# Patient Record
Sex: MALE | Race: WHITE | NOT HISPANIC OR LATINO | Employment: PART TIME | URBAN - METROPOLITAN AREA
[De-identification: names, ages, dates, MRNs, and addresses within clinical notes are randomized per-mention and may not be internally consistent; named-entity substitution may affect disease eponyms.]

---
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
## 2024-01-11 ENCOUNTER — OFFICE VISIT (OUTPATIENT)
Dept: PRIMARY CARE | Facility: CLINIC | Age: 19
End: 2024-01-11
Payer: COMMERCIAL

## 2024-01-11 VITALS
HEIGHT: 75 IN | SYSTOLIC BLOOD PRESSURE: 120 MMHG | TEMPERATURE: 98 F | HEART RATE: 85 BPM | BODY MASS INDEX: 39.17 KG/M2 | WEIGHT: 315 LBS | DIASTOLIC BLOOD PRESSURE: 69 MMHG

## 2024-01-11 DIAGNOSIS — E66.01 CLASS 3 SEVERE OBESITY DUE TO EXCESS CALORIES WITH SERIOUS COMORBIDITY AND BODY MASS INDEX (BMI) OF 50.0 TO 59.9 IN ADULT (MULTI): ICD-10-CM

## 2024-01-11 DIAGNOSIS — Z71.3 ENCOUNTER FOR WEIGHT LOSS COUNSELING: Primary | ICD-10-CM

## 2024-01-11 DIAGNOSIS — I10 PRIMARY HYPERTENSION: ICD-10-CM

## 2024-01-11 PROCEDURE — 3074F SYST BP LT 130 MM HG: CPT | Performed by: INTERNAL MEDICINE

## 2024-01-11 PROCEDURE — 1036F TOBACCO NON-USER: CPT | Performed by: INTERNAL MEDICINE

## 2024-01-11 PROCEDURE — 3008F BODY MASS INDEX DOCD: CPT | Performed by: INTERNAL MEDICINE

## 2024-01-11 PROCEDURE — 99213 OFFICE O/P EST LOW 20 MIN: CPT | Performed by: INTERNAL MEDICINE

## 2024-01-11 PROCEDURE — 3078F DIAST BP <80 MM HG: CPT | Performed by: INTERNAL MEDICINE

## 2024-01-11 RX ORDER — PHENTERMINE HYDROCHLORIDE 37.5 MG/1
37.5 TABLET ORAL
Qty: 30 TABLET | Refills: 0 | Status: SHIPPED | OUTPATIENT
Start: 2024-01-11 | End: 2024-02-13 | Stop reason: SINTOL

## 2024-01-11 ASSESSMENT — ENCOUNTER SYMPTOMS
NERVOUS/ANXIOUS: 0
LIGHT-HEADEDNESS: 0
DIZZINESS: 0
SHORTNESS OF BREATH: 0
ABDOMINAL PAIN: 0
NAUSEA: 0
CHILLS: 0
COUGH: 0
SORE THROAT: 0
VOMITING: 0
FEVER: 0

## 2024-01-11 NOTE — PROGRESS NOTES
"Subjective   Patient ID: Olivier Kilgore is a 18 y.o. male who presents for Follow-up (1 month ).    HPI patient presents to the office today to follow-up on weight loss.  This is a 1 month follow-up for the patient.  He was last seen on December 4, 2023 at that time patient's weight was 446 pounds today his weight is 444 pounds.  His blood pressure is now well-controlled.  Blood pressure 120/69 today in the office last blood pressure is 160/89 in December.  We did increase his lisinopril during last office visit.  Patient tolerating medications well.  No adverse side effects.  Patient tolerating topiramate well for weight loss.  Patient is interested in starting phentermine for weight loss as well.  Will follow-up in 1 month for recheck.  Is already dieting and exercising but is not losing a pound a week which is our goal.    Review of Systems   Constitutional:  Negative for chills and fever.   HENT:  Negative for sore throat.    Eyes:  Negative for visual disturbance.   Respiratory:  Negative for cough and shortness of breath.    Cardiovascular:  Negative for chest pain.   Gastrointestinal:  Negative for abdominal pain, nausea and vomiting.   Skin:  Negative for rash.   Neurological:  Negative for dizziness and light-headedness.   Psychiatric/Behavioral:  Negative for behavioral problems. The patient is not nervous/anxious.        Objective   /69   Pulse 85   Temp 36.7 °C (98 °F) (Temporal)   Ht 1.905 m (6' 3\")   Wt (!) 202 kg (444 lb 6.4 oz)   BMI 55.55 kg/m²     Physical Exam  Vitals and nursing note reviewed.   Constitutional:       General: He is not in acute distress.     Appearance: Normal appearance. He is obese. He is not ill-appearing, toxic-appearing or diaphoretic.   HENT:      Head: Normocephalic and atraumatic.      Mouth/Throat:      Mouth: Mucous membranes are moist.      Pharynx: Oropharynx is clear. No oropharyngeal exudate.   Eyes:      Pupils: Pupils are equal, round, and reactive to " light.   Cardiovascular:      Rate and Rhythm: Normal rate and regular rhythm.      Heart sounds: Normal heart sounds.   Pulmonary:      Effort: Pulmonary effort is normal. No respiratory distress.      Breath sounds: Normal breath sounds. No wheezing.   Musculoskeletal:      Cervical back: Neck supple. No tenderness.   Skin:     General: Skin is warm and dry.   Neurological:      General: No focal deficit present.      Mental Status: He is alert and oriented to person, place, and time.   Psychiatric:         Mood and Affect: Mood normal.         Behavior: Behavior normal.         Thought Content: Thought content normal.         Judgment: Judgment normal.         Assessment/Plan   Problem List Items Addressed This Visit             ICD-10-CM    Obesity E66.9    Relevant Medications    phentermine (Adipex-P) 37.5 mg tablet    Hypertension I10    Encounter for weight loss counseling - Primary Z71.3     Encounter for weight loss counseling/obesity: Patient with BMI 55.5.  We are going to start phentermine in addition to topiramate and see him back in 1 month.  [facility] report was reviewed and appropriate.  We discussed the goals for this medication.  Patient agreeable to this medication aware of the risk versus benefits.  Blood pressure now well-controlled.  Will continue diet and exercise modifications and exercising 30 to 45 minutes with moderate intensity aerobic exercise most days of the week and also decreasing portion sizes and focusing on green leafy vegetables lean meats avoiding potatoes, pastries, cakes, breads.

## 2024-02-13 ENCOUNTER — OFFICE VISIT (OUTPATIENT)
Dept: PRIMARY CARE | Facility: CLINIC | Age: 19
End: 2024-02-13
Payer: COMMERCIAL

## 2024-02-13 VITALS
DIASTOLIC BLOOD PRESSURE: 78 MMHG | SYSTOLIC BLOOD PRESSURE: 131 MMHG | HEIGHT: 75 IN | TEMPERATURE: 98 F | WEIGHT: 315 LBS | HEART RATE: 90 BPM | BODY MASS INDEX: 39.17 KG/M2

## 2024-02-13 DIAGNOSIS — Z71.3 ENCOUNTER FOR WEIGHT LOSS COUNSELING: ICD-10-CM

## 2024-02-13 DIAGNOSIS — R63.5 ABNORMAL WEIGHT GAIN: ICD-10-CM

## 2024-02-13 DIAGNOSIS — E66.01 CLASS 3 SEVERE OBESITY DUE TO EXCESS CALORIES WITH SERIOUS COMORBIDITY AND BODY MASS INDEX (BMI) OF 50.0 TO 59.9 IN ADULT (MULTI): ICD-10-CM

## 2024-02-13 DIAGNOSIS — I10 PRIMARY HYPERTENSION: Primary | ICD-10-CM

## 2024-02-13 PROCEDURE — 3008F BODY MASS INDEX DOCD: CPT | Performed by: INTERNAL MEDICINE

## 2024-02-13 PROCEDURE — 3075F SYST BP GE 130 - 139MM HG: CPT | Performed by: INTERNAL MEDICINE

## 2024-02-13 PROCEDURE — 3078F DIAST BP <80 MM HG: CPT | Performed by: INTERNAL MEDICINE

## 2024-02-13 PROCEDURE — 1036F TOBACCO NON-USER: CPT | Performed by: INTERNAL MEDICINE

## 2024-02-13 PROCEDURE — 99213 OFFICE O/P EST LOW 20 MIN: CPT | Performed by: INTERNAL MEDICINE

## 2024-02-13 ASSESSMENT — ENCOUNTER SYMPTOMS
CHILLS: 0
SHORTNESS OF BREATH: 0
COUGH: 0
PALPITATIONS: 0
HEADACHES: 0
AGITATION: 0
FEVER: 0
DIZZINESS: 0
LIGHT-HEADEDNESS: 0

## 2024-02-13 NOTE — PROGRESS NOTES
"Subjective   Patient ID: Olivier Kilgore is a 18 y.o. male who presents for Weight Management (Follow up ).    HPI Patient was taking phentermine but started to make him feel anxious, low energy, and high feeling so he stopped the medication 2 weeks ago.  At this time he wants to concentrate on conventional weight loss continue topiramate to help with appetite suppression.  We discussed other options for weight loss medication but at this time the options are cost prohibitive in terms of GLP-1 agonist medication as well as Contrave.  We discussed metformin at this time he wants to hold off on starting metformin for weight loss.  We also discussed referral to a weight loss specialist which she will consider at a 3-month follow-up.  Otherwise tolerating his medications well.  No fever chills chest pain shortness of breath nausea or vomiting.    Review of Systems   Constitutional:  Negative for chills and fever.   Eyes:  Negative for visual disturbance.   Respiratory:  Negative for cough and shortness of breath.    Cardiovascular:  Negative for chest pain, palpitations and leg swelling.   Skin:  Negative for rash.   Neurological:  Negative for dizziness, light-headedness and headaches.   Psychiatric/Behavioral:  Negative for agitation.        Objective   /78   Pulse 90   Temp 36.7 °C (98 °F) (Temporal)   Ht 1.905 m (6' 3\")   Wt (!) 202 kg (445 lb)   BMI 55.62 kg/m²     Physical Exam  Vitals and nursing note reviewed.   Constitutional:       General: He is not in acute distress.     Appearance: Normal appearance. He is obese. He is not toxic-appearing.   Cardiovascular:      Rate and Rhythm: Normal rate and regular rhythm.      Heart sounds: Normal heart sounds.   Pulmonary:      Effort: Pulmonary effort is normal. No respiratory distress.      Breath sounds: Normal breath sounds. No wheezing, rhonchi or rales.   Skin:     General: Skin is warm and dry.   Neurological:      General: No focal deficit present.    "   Mental Status: He is alert and oriented to person, place, and time.   Psychiatric:         Mood and Affect: Mood normal.         Behavior: Behavior normal.         Thought Content: Thought content normal.         Judgment: Judgment normal.         Assessment/Plan   Problem List Items Addressed This Visit             ICD-10-CM    Obesity E66.9    Abnormal weight gain R63.5    Hypertension - Primary I10    Encounter for weight loss counseling Z71.3     Counter for weight loss counseling/obesity: Patient did not tolerate phentermine.  Wants to concentrate on more conventional weight loss and continue topiramate.  Follow-up in 3 months for recheck at that time we can decide about adding metformin or sending him to a weight loss specialist.  He is unable to afford Mounjaro, Ozempic, Wegovy, is at [facility] or Contrave at this time.    Hypertension: Chronic, stable continue current blood pressure medication regimen

## 2024-05-14 ENCOUNTER — OFFICE VISIT (OUTPATIENT)
Dept: PRIMARY CARE | Facility: CLINIC | Age: 19
End: 2024-05-14
Payer: COMMERCIAL

## 2024-05-14 VITALS
WEIGHT: 315 LBS | HEIGHT: 75 IN | HEART RATE: 93 BPM | BODY MASS INDEX: 39.17 KG/M2 | SYSTOLIC BLOOD PRESSURE: 155 MMHG | TEMPERATURE: 98 F | DIASTOLIC BLOOD PRESSURE: 83 MMHG

## 2024-05-14 DIAGNOSIS — E66.01 CLASS 3 SEVERE OBESITY DUE TO EXCESS CALORIES WITH SERIOUS COMORBIDITY AND BODY MASS INDEX (BMI) OF 50.0 TO 59.9 IN ADULT (MULTI): ICD-10-CM

## 2024-05-14 DIAGNOSIS — F90.0 ADHD (ATTENTION DEFICIT HYPERACTIVITY DISORDER), INATTENTIVE TYPE: ICD-10-CM

## 2024-05-14 DIAGNOSIS — Z71.3 ENCOUNTER FOR WEIGHT LOSS COUNSELING: ICD-10-CM

## 2024-05-14 DIAGNOSIS — I10 PRIMARY HYPERTENSION: Primary | ICD-10-CM

## 2024-05-14 DIAGNOSIS — E03.9 HYPOTHYROIDISM, UNSPECIFIED TYPE: ICD-10-CM

## 2024-05-14 PROCEDURE — 1036F TOBACCO NON-USER: CPT | Performed by: INTERNAL MEDICINE

## 2024-05-14 PROCEDURE — 3008F BODY MASS INDEX DOCD: CPT | Performed by: INTERNAL MEDICINE

## 2024-05-14 PROCEDURE — 99214 OFFICE O/P EST MOD 30 MIN: CPT | Performed by: INTERNAL MEDICINE

## 2024-05-14 PROCEDURE — 3079F DIAST BP 80-89 MM HG: CPT | Performed by: INTERNAL MEDICINE

## 2024-05-14 PROCEDURE — 3077F SYST BP >= 140 MM HG: CPT | Performed by: INTERNAL MEDICINE

## 2024-05-14 RX ORDER — LISINOPRIL 40 MG/1
40 TABLET ORAL DAILY
Qty: 90 TABLET | Refills: 1 | Status: SHIPPED | OUTPATIENT
Start: 2024-05-14 | End: 2024-11-10

## 2024-05-14 RX ORDER — LEVOTHYROXINE SODIUM 25 UG/1
25 TABLET ORAL DAILY
Qty: 90 TABLET | Refills: 1 | Status: SHIPPED | OUTPATIENT
Start: 2024-05-14 | End: 2024-11-10

## 2024-05-14 RX ORDER — AMLODIPINE BESYLATE 5 MG/1
5 TABLET ORAL DAILY
Qty: 90 TABLET | Refills: 1 | Status: SHIPPED | OUTPATIENT
Start: 2024-05-14 | End: 2024-11-10

## 2024-05-14 RX ORDER — TOPIRAMATE 100 MG/1
100 TABLET, FILM COATED ORAL 2 TIMES DAILY
Qty: 180 TABLET | Refills: 1 | Status: SHIPPED | OUTPATIENT
Start: 2024-05-14 | End: 2024-11-10

## 2024-05-14 RX ORDER — METFORMIN HYDROCHLORIDE 500 MG/1
500 TABLET ORAL
Qty: 180 TABLET | Refills: 1 | Status: SHIPPED | OUTPATIENT
Start: 2024-05-14 | End: 2024-11-10

## 2024-05-14 ASSESSMENT — ENCOUNTER SYMPTOMS
COUGH: 0
DIZZINESS: 0
SORE THROAT: 0
CHILLS: 0
CONSTIPATION: 0
ABDOMINAL PAIN: 0
FEVER: 0
SHORTNESS OF BREATH: 0
VOMITING: 0
LIGHT-HEADEDNESS: 0
NAUSEA: 0
BLOOD IN STOOL: 0
DYSURIA: 0
DIARRHEA: 0

## 2024-05-14 NOTE — PROGRESS NOTES
"Subjective   Patient ID: Olivier Kilgore is a 19 y.o. male who presents for Follow-up (3 month ).    HPI patient presents to the office today for 3-month follow-up we have been monitoring him for weight loss management has been on Topamax 100 mg 2 times daily.  He tried phentermine had adverse effects from the medication it was discontinued.  Unfortunately he has gained approximately 10 pounds since last office visit in February.  He recently started working at [employer].  He ran out of his blood pressure medications 1 week ago and blood pressure is elevated in the office today 155/83.  He has refills on all of his medications today.  He is interested in trying another medication for weight loss and we discussed since he does have some insulin insensitivity to trying metformin.  He is concerned because he will be switching insurance coverage from [employer] to his father's insurance plan for his commercial insurance next month.  He is not interested in starting any of the GLP-1 medications for weight loss because of this and also because his insurance plan did not cover them.  Denies any fever, chills, chest pain or shortness of breath with nausea, vomiting diarrhea, abdominal pain    Review of Systems   Constitutional:  Negative for chills and fever.   HENT:  Negative for sore throat.    Eyes:  Negative for visual disturbance.   Respiratory:  Negative for cough and shortness of breath.    Cardiovascular:  Negative for chest pain.   Gastrointestinal:  Negative for abdominal pain, blood in stool, constipation, diarrhea, nausea and vomiting.   Genitourinary:  Negative for dysuria.   Skin:  Negative for rash.   Neurological:  Negative for dizziness, syncope and light-headedness.       Objective   /83   Pulse 93   Temp 36.7 °C (98 °F) (Temporal)   Ht 1.905 m (6' 3\")   Wt (!) 206 kg (455 lb)   BMI 56.87 kg/m²     Physical Exam  Vitals and nursing note reviewed.   Constitutional:       General: He is not in acute " distress.     Appearance: Normal appearance. He is obese. He is not ill-appearing, toxic-appearing or diaphoretic.   HENT:      Head: Normocephalic and atraumatic.      Mouth/Throat:      Mouth: Mucous membranes are moist.      Pharynx: Oropharynx is clear. No oropharyngeal exudate.   Eyes:      Pupils: Pupils are equal, round, and reactive to light.   Cardiovascular:      Rate and Rhythm: Normal rate and regular rhythm.      Heart sounds: Normal heart sounds.   Pulmonary:      Effort: Pulmonary effort is normal. No respiratory distress.      Breath sounds: Normal breath sounds. No wheezing, rhonchi or rales.   Musculoskeletal:      Cervical back: Neck supple. No tenderness.      Right lower leg: No edema.      Left lower leg: No edema.   Skin:     General: Skin is warm and dry.      Coloration: Skin is not jaundiced.      Findings: No bruising.   Neurological:      General: No focal deficit present.      Mental Status: He is alert and oriented to person, place, and time.   Psychiatric:         Mood and Affect: Mood normal.         Behavior: Behavior normal.         Thought Content: Thought content normal.         Judgment: Judgment normal.         Assessment/Plan   Problem List Items Addressed This Visit             ICD-10-CM    ADHD (attention deficit hyperactivity disorder), inattentive type F90.0    Relevant Orders    Referral to Psychiatry    Obesity E66.9    Relevant Medications    topiramate (Topamax) 100 mg tablet    metFORMIN (Glucophage) 500 mg tablet    Hypertension - Primary I10    Relevant Medications    amLODIPine (Norvasc) 5 mg tablet    lisinopril 40 mg tablet    Encounter for weight loss counseling Z71.3    Relevant Medications    topiramate (Topamax) 100 mg tablet    metFORMIN (Glucophage) 500 mg tablet    Hypothyroidism E03.9    Relevant Medications    levothyroxine (Synthroid, Levoxyl) 25 mcg tablet     Hypertension: Chronic, stable refills provided for amlodipine and  lisinopril.    Hypothyroidism: Chronic, stable refills provided for Synthroid    Counter for weight loss counseling: Will continue Topamax and start patient on metformin.  See him back in 2 weeks for recheck.    ADHD: He mentions history of ADHD which he has not been treated for recently.  He thought that Vyvanse would be a good medication for him because of his ADHD and because he states his brother is on the medication has lost weight.  Will have him see psychiatry for further evaluation to see if he would be a good candidate for this.  I did discuss with the patient that because he had adverse reaction to phentermine he may also not tolerate other stimulant type medications.